# Patient Record
Sex: MALE
[De-identification: names, ages, dates, MRNs, and addresses within clinical notes are randomized per-mention and may not be internally consistent; named-entity substitution may affect disease eponyms.]

---

## 2019-06-07 ENCOUNTER — HOSPITAL ENCOUNTER (EMERGENCY)
Dept: HOSPITAL 62 - ER | Age: 59
Discharge: HOME | End: 2019-06-07
Payer: COMMERCIAL

## 2019-06-07 VITALS — SYSTOLIC BLOOD PRESSURE: 140 MMHG | DIASTOLIC BLOOD PRESSURE: 82 MMHG

## 2019-06-07 DIAGNOSIS — S30.860A: Primary | ICD-10-CM

## 2019-06-07 DIAGNOSIS — S90.561A: ICD-10-CM
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
## 2019-06-07 PROCEDURE — 99281 EMR DPT VST MAYX REQ PHY/QHP: CPT

## 2019-06-07 NOTE — ER DOCUMENT REPORT
HPI





- HPI


Patient complains to provider of: tick bite


Time Seen by Provider: 06/07/19 09:40


Pain Level: 1


Context: 





58-year-old male visiting from [place] presents the emergency department with 

chief complaint of tick bite.  He said that he does not know if he got it from a

dog but his partner he was here with said she was bitten and was her last night 

and proceed prophylaxis.  He said that he does not know how long the bug was 

attached but says he got to bites, one on the right gluteus muscle and one on 

his anterior right ankle.  He denies any fevers, bone aches, nausea, vomiting, 

rash, dizziness or lightheadedness, headache.  No other complaints.





Past Medical History





- Social History


Smoking Status: Never Smoker


Family History: None





Vertical Provider Document





- CONSTITUTIONAL


Notes: 





PHYSICAL EXAMINATION:





Reviewed vital signs and charting by RN





GENERAL: Alert, interacts well. No acute distress.


HEAD: Normocephalic, atraumatic.


EYES: Pupils equal, round. Extraocular movements intact.


ENT: Oral mucosa moist, tongue midline. 


NECK: Full range of motion. Trachea midline.


EXTREMITIES: Moves all 4 extremities spontaneously. No edema,  No cyanosis.


PSYCH: Normal affect, normal mood.


SKIN: Warm, dry, normal turgor. No rashes or lesions noted.








- INFECTION CONTROL


TRAVEL OUTSIDE OF THE [place] IN LAST 30 DAYS: No





Course





- Re-evaluation


Re-evalutation: 





06/07/19 09:51


Well-appearing and no evidence of any tick bite or residual bugs.  Patient 

showed me a picture of the offending arthropod unclear if it was a take or not. 

I will give him prophylaxis doxycycline 200 mg p.o. 1 time.  I gave him strict 

return precautions and what to look out for for [place] spotted fever and/or

Lyme disease.  Patient is afebrile does not present with any concerning signs.  

It does not appear that the insect was engorged or attached for greater than 36 

hours.  Stable for discharge.





- Vital Signs


Vital signs: 


                                        











Temp Pulse Resp BP Pulse Ox


 


 97.6 F   70   18   140/82 H  96 


 


 06/07/19 09:04  06/07/19 09:04  06/07/19 09:04  06/07/19 09:04  06/07/19 09:04














Discharge





- Discharge


Clinical Impression: 


Bug bite


Qualifiers:


 Encounter type: initial encounter Qualified Code(s): W57.XXXA - Bitten or stung

by nonvenomous insect and other nonvenomous arthropods, initial encounter





Disposition: HOME, SELF-CARE


Additional Instructions: 


You were seen in the emergency department this morning for a bug bite.  It is 

unclear how long the bug was attached but regardless we have treated you for 

prophylaxis for Lyme disease and/or [place] spotted fever.  We have given

you doxycycline 200 mg orally 1 time here in the emergency department.  If you 

do receive a bite in the next 2 weeks please try to note how long you think the 

bug was attached and feeding, if you develop a "bull's-eye" rash, develop severe

headache, develop joint aches, or become generally ill.  If this is the case you

should return to the emergency department or an urgent care for follow on 

evaluation.  If you develop severe headache, photophobia, acute shortness of 

breath or severe chest pain, intractable nausea or vomiting please return to the

emergency department for reevaluation.